# Patient Record
Sex: FEMALE | Race: WHITE | URBAN - METROPOLITAN AREA
[De-identification: names, ages, dates, MRNs, and addresses within clinical notes are randomized per-mention and may not be internally consistent; named-entity substitution may affect disease eponyms.]

---

## 2023-05-05 DIAGNOSIS — Z01.419 WELL FEMALE EXAM WITH ROUTINE GYNECOLOGICAL EXAM: Primary | ICD-10-CM

## 2023-05-05 RX ORDER — NORETHINDRONE ACETATE AND ETHINYL ESTRADIOL 1MG-20(21)
1 KIT ORAL DAILY
Qty: 84 TABLET | Refills: 4 | Status: SHIPPED | OUTPATIENT
Start: 2023-05-05

## 2023-07-27 DIAGNOSIS — Z01.419 WELL FEMALE EXAM WITH ROUTINE GYNECOLOGICAL EXAM: ICD-10-CM

## 2023-07-27 RX ORDER — NORETHINDRONE ACETATE AND ETHINYL ESTRADIOL 1MG-20(21)
1 KIT ORAL DAILY
Qty: 84 TABLET | Refills: 0 | Status: SHIPPED | OUTPATIENT
Start: 2023-07-27

## 2023-11-01 DIAGNOSIS — Z01.419 WELL FEMALE EXAM WITH ROUTINE GYNECOLOGICAL EXAM: ICD-10-CM

## 2023-11-01 RX ORDER — NORETHINDRONE ACETATE AND ETHINYL ESTRADIOL 1MG-20(21)
1 KIT ORAL DAILY
Qty: 84 TABLET | Refills: 0 | Status: SHIPPED | OUTPATIENT
Start: 2023-11-01

## 2024-02-03 DIAGNOSIS — Z01.419 WELL FEMALE EXAM WITH ROUTINE GYNECOLOGICAL EXAM: ICD-10-CM

## 2024-02-04 RX ORDER — NORETHINDRONE ACETATE AND ETHINYL ESTRADIOL 1MG-20(21)
1 KIT ORAL DAILY
Qty: 84 TABLET | Refills: 0 | Status: SHIPPED | OUTPATIENT
Start: 2024-02-04

## 2024-04-19 DIAGNOSIS — Z01.419 WELL FEMALE EXAM WITH ROUTINE GYNECOLOGICAL EXAM: ICD-10-CM

## 2024-04-19 RX ORDER — NORETHINDRONE ACETATE AND ETHINYL ESTRADIOL 1MG-20(21)
1 KIT ORAL DAILY
Qty: 84 TABLET | Refills: 0 | OUTPATIENT
Start: 2024-04-19

## 2024-04-19 RX ORDER — NORETHINDRONE ACETATE AND ETHINYL ESTRADIOL 1MG-20(21)
1 KIT ORAL DAILY
Qty: 84 TABLET | Refills: 0 | Status: SHIPPED | OUTPATIENT
Start: 2024-04-19

## 2024-06-14 ENCOUNTER — OFFICE VISIT (OUTPATIENT)
Age: 29
End: 2024-06-14
Payer: COMMERCIAL

## 2024-06-14 VITALS
DIASTOLIC BLOOD PRESSURE: 76 MMHG | OXYGEN SATURATION: 98 % | BODY MASS INDEX: 44.26 KG/M2 | SYSTOLIC BLOOD PRESSURE: 128 MMHG | HEIGHT: 67 IN | WEIGHT: 282 LBS | HEART RATE: 90 BPM

## 2024-06-14 DIAGNOSIS — Z01.419 WELL FEMALE EXAM WITH ROUTINE GYNECOLOGICAL EXAM: Primary | ICD-10-CM

## 2024-06-14 PROCEDURE — 99395 PREV VISIT EST AGE 18-39: CPT | Performed by: PHYSICIAN ASSISTANT

## 2024-06-14 RX ORDER — NORETHINDRONE ACETATE AND ETHINYL ESTRADIOL 1MG-20(21)
1 KIT ORAL DAILY
Qty: 84 TABLET | Refills: 3 | Status: SHIPPED | OUTPATIENT
Start: 2024-06-14

## 2024-06-14 RX ORDER — METFORMIN HYDROCHLORIDE 500 MG/1
500 TABLET, EXTENDED RELEASE ORAL
COMMUNITY
Start: 2024-06-06

## 2024-06-14 RX ORDER — ACETAMINOPHEN 160 MG
TABLET,DISINTEGRATING ORAL
COMMUNITY
Start: 2024-06-07

## 2024-06-14 NOTE — PROGRESS NOTES
Patient here for ROUTINE GYN EXAM.    Patient is 28 y.o. year old female G 0P 0.  She is here today for her routine gyn exam. She is using blisovi OCP for birth control.  Patient's last menstrual period was 05/08/2024 (exact date).. Menses every 28 days, duration 4-5 days.  Pt does not smoke.  She denies alcohol/drug abuse.  She denies domestic violence/abuse.   She denies problems with her breasts, bladder, or bowel.        Patient declines gonorrhea/ chlamydia testing.   Patient declines STD blood work including HIV.      She has not completed the gardasil vaccine series and patient declines    Pt with no history of abnormal pap smears.  We discussed the current ACOG pap guidelines. . Last pap was 4/21/23 and results were negative.      Domestic violence screen: negative    She has no complaints today.     Family History breast cancer:  no  Family History ovarian cancer: no  Family History colon cancer: no      Review of Systems   Constitutional: Negative.    HENT: Negative.    Eyes: Negative.    Respiratory: Negative.    Cardiovascular: Negative.    Gastrointestinal: Negative.    Endocrine: Negative.    Genitourinary:        As noted in HPI   Musculoskeletal: Negative.    Skin: Negative.    Allergic/Immunologic: Negative.    Neurological: Negative.    Hematological: Negative.    Psychiatric/Behavioral: Negative      Current Outpatient Medications on File Prior to Visit   Medication Sig Dispense Refill    labetalol (NORMODYNE) 100 mg tablet Take 100 mg by mouth      norethindrone-ethinyl estradiol (Blisovi FE 1/20) 1-20 MG-MCG per tablet Take 1 tablet by mouth daily 84 tablet 0     No current facility-administered medications on file prior to visit.       Allergies   Allergen Reactions    Cat Hair Extract Other (See Comments)    Dust Mite Extract Other (See Comments)    Pollen Extract Other (See Comments)    Tree Extract Other (See Comments)       Past Surgical History:   Procedure Laterality Date    ADENOIDECTOMY       REDUCTION MAMMAPLASTY Bilateral 06/2018           There were no vitals filed for this visit.        EXAM:    Neck: supple without nodes or thyromegaly  Heart: regular rate and rhythm  Lungs: clear to auscultation without rales, rhonci  Breasts: nontender, no masses, no discoloration, no discharge  Abdomen: soft, nontender, no masses  Ext genitalia: no lesions, no discoloration  Urethra: no discharge or erythema  Bladder: nontender, good support  Vagina: no discharge, no lesions  Cervix: no lesions, no cervical motion tenderness  Adnexa: nontender, no masses  Uterus: nontender, normal size and shape    Assessment:  Routine GYN exam    Plan:  no pap done today.  OCP for birth control  blisovi #[id number] one daily 3RF  gardasil :  patient declines

## 2025-06-19 DIAGNOSIS — Z01.419 WELL FEMALE EXAM WITH ROUTINE GYNECOLOGICAL EXAM: ICD-10-CM

## 2025-06-19 RX ORDER — NORETHINDRONE ACETATE AND ETHINYL ESTRADIOL 1MG-20(21)
1 KIT ORAL DAILY
Qty: 84 TABLET | Refills: 3 | Status: SHIPPED | OUTPATIENT
Start: 2025-06-19

## 2025-06-20 NOTE — TELEPHONE ENCOUNTER
Please call patient and get her scheduled for yearly exam at next available opening.  Last yearly 6/14/24